# Patient Record
Sex: MALE | Race: WHITE | NOT HISPANIC OR LATINO | URBAN - METROPOLITAN AREA
[De-identification: names, ages, dates, MRNs, and addresses within clinical notes are randomized per-mention and may not be internally consistent; named-entity substitution may affect disease eponyms.]

---

## 2017-01-16 ENCOUNTER — INPATIENT (INPATIENT)
Facility: HOSPITAL | Age: 52
LOS: 0 days | Discharge: HOME | End: 2017-01-17
Attending: INTERNAL MEDICINE

## 2017-03-26 ENCOUNTER — EMERGENCY (EMERGENCY)
Facility: HOSPITAL | Age: 52
LOS: 0 days | Discharge: HOME | End: 2017-03-26
Admitting: INTERNAL MEDICINE

## 2017-06-27 DIAGNOSIS — H93.13 TINNITUS, BILATERAL: ICD-10-CM

## 2017-06-27 DIAGNOSIS — Z79.02 LONG TERM (CURRENT) USE OF ANTITHROMBOTICS/ANTIPLATELETS: ICD-10-CM

## 2017-06-27 DIAGNOSIS — Z79.899 OTHER LONG TERM (CURRENT) DRUG THERAPY: ICD-10-CM

## 2017-06-27 DIAGNOSIS — I10 ESSENTIAL (PRIMARY) HYPERTENSION: ICD-10-CM

## 2017-06-27 DIAGNOSIS — R20.2 PARESTHESIA OF SKIN: ICD-10-CM

## 2017-06-27 DIAGNOSIS — Z87.891 PERSONAL HISTORY OF NICOTINE DEPENDENCE: ICD-10-CM

## 2017-06-27 DIAGNOSIS — R07.89 OTHER CHEST PAIN: ICD-10-CM

## 2017-06-27 DIAGNOSIS — E78.00 PURE HYPERCHOLESTEROLEMIA, UNSPECIFIED: ICD-10-CM

## 2017-07-18 DIAGNOSIS — R07.9 CHEST PAIN, UNSPECIFIED: ICD-10-CM

## 2017-07-18 DIAGNOSIS — Z72.0 TOBACCO USE: ICD-10-CM

## 2017-07-18 DIAGNOSIS — I25.10 ATHEROSCLEROTIC HEART DISEASE OF NATIVE CORONARY ARTERY WITHOUT ANGINA PECTORIS: ICD-10-CM

## 2017-07-18 DIAGNOSIS — Z82.49 FAMILY HISTORY OF ISCHEMIC HEART DISEASE AND OTHER DISEASES OF THE CIRCULATORY SYSTEM: ICD-10-CM

## 2017-07-18 DIAGNOSIS — R91.1 SOLITARY PULMONARY NODULE: ICD-10-CM

## 2017-07-18 DIAGNOSIS — E78.5 HYPERLIPIDEMIA, UNSPECIFIED: ICD-10-CM

## 2017-07-18 DIAGNOSIS — M54.9 DORSALGIA, UNSPECIFIED: ICD-10-CM

## 2017-07-18 DIAGNOSIS — G89.29 OTHER CHRONIC PAIN: ICD-10-CM

## 2018-01-26 ENCOUNTER — OUTPATIENT (OUTPATIENT)
Dept: OUTPATIENT SERVICES | Facility: HOSPITAL | Age: 53
LOS: 1 days | Discharge: HOME | End: 2018-01-26

## 2018-01-26 DIAGNOSIS — R05 COUGH: ICD-10-CM

## 2018-02-04 DIAGNOSIS — I25.10 ATHEROSCLEROTIC HEART DISEASE OF NATIVE CORONARY ARTERY WITHOUT ANGINA PECTORIS: ICD-10-CM

## 2018-02-04 DIAGNOSIS — R07.9 CHEST PAIN, UNSPECIFIED: ICD-10-CM

## 2022-03-28 ENCOUNTER — OUTPATIENT (OUTPATIENT)
Dept: OUTPATIENT SERVICES | Facility: HOSPITAL | Age: 57
LOS: 1 days | Discharge: HOME | End: 2022-03-28
Payer: COMMERCIAL

## 2022-03-28 DIAGNOSIS — R53.83 OTHER FATIGUE: ICD-10-CM

## 2022-03-28 PROCEDURE — 75574 CT ANGIO HRT W/3D IMAGE: CPT | Mod: 26

## 2022-03-30 ENCOUNTER — OUTPATIENT (OUTPATIENT)
Dept: OUTPATIENT SERVICES | Facility: HOSPITAL | Age: 57
LOS: 1 days | Discharge: HOME | End: 2022-03-30
Payer: COMMERCIAL

## 2022-03-30 PROCEDURE — 0504T: CPT

## 2022-03-31 DIAGNOSIS — R53.83 OTHER FATIGUE: ICD-10-CM

## 2022-04-11 ENCOUNTER — OUTPATIENT (OUTPATIENT)
Dept: OUTPATIENT SERVICES | Facility: HOSPITAL | Age: 57
LOS: 1 days | Discharge: HOME | End: 2022-04-11

## 2022-04-11 VITALS
RESPIRATION RATE: 16 BRPM | HEART RATE: 68 BPM | OXYGEN SATURATION: 100 % | DIASTOLIC BLOOD PRESSURE: 87 MMHG | TEMPERATURE: 97 F | SYSTOLIC BLOOD PRESSURE: 138 MMHG

## 2022-04-11 DIAGNOSIS — Z98.890 OTHER SPECIFIED POSTPROCEDURAL STATES: Chronic | ICD-10-CM

## 2022-04-11 LAB
ANION GAP SERPL CALC-SCNC: 13 MMOL/L — SIGNIFICANT CHANGE UP (ref 7–14)
BUN SERPL-MCNC: 13 MG/DL — SIGNIFICANT CHANGE UP (ref 10–20)
CALCIUM SERPL-MCNC: 10 MG/DL — SIGNIFICANT CHANGE UP (ref 8.5–10.1)
CHLORIDE SERPL-SCNC: 103 MMOL/L — SIGNIFICANT CHANGE UP (ref 98–110)
CO2 SERPL-SCNC: 26 MMOL/L — SIGNIFICANT CHANGE UP (ref 17–32)
CREAT SERPL-MCNC: 0.8 MG/DL — SIGNIFICANT CHANGE UP (ref 0.7–1.5)
EGFR: 104 ML/MIN/1.73M2 — SIGNIFICANT CHANGE UP
GLUCOSE SERPL-MCNC: 91 MG/DL — SIGNIFICANT CHANGE UP (ref 70–99)
HCT VFR BLD CALC: 41.7 % — LOW (ref 42–52)
HCT VFR BLD CALC: 45.9 % — SIGNIFICANT CHANGE UP (ref 42–52)
HGB BLD-MCNC: 14.7 G/DL — SIGNIFICANT CHANGE UP (ref 14–18)
HGB BLD-MCNC: 15.5 G/DL — SIGNIFICANT CHANGE UP (ref 14–18)
MCHC RBC-ENTMCNC: 29.9 PG — SIGNIFICANT CHANGE UP (ref 27–31)
MCHC RBC-ENTMCNC: 30.2 PG — SIGNIFICANT CHANGE UP (ref 27–31)
MCHC RBC-ENTMCNC: 33.8 G/DL — SIGNIFICANT CHANGE UP (ref 32–37)
MCHC RBC-ENTMCNC: 35.3 G/DL — SIGNIFICANT CHANGE UP (ref 32–37)
MCV RBC AUTO: 85.8 FL — SIGNIFICANT CHANGE UP (ref 80–94)
MCV RBC AUTO: 88.4 FL — SIGNIFICANT CHANGE UP (ref 80–94)
NRBC # BLD: 0 /100 WBCS — SIGNIFICANT CHANGE UP (ref 0–0)
NRBC # BLD: 0 /100 WBCS — SIGNIFICANT CHANGE UP (ref 0–0)
PLATELET # BLD AUTO: 273 K/UL — SIGNIFICANT CHANGE UP (ref 130–400)
PLATELET # BLD AUTO: 317 K/UL — SIGNIFICANT CHANGE UP (ref 130–400)
POTASSIUM SERPL-MCNC: 5.4 MMOL/L — HIGH (ref 3.5–5)
POTASSIUM SERPL-SCNC: 5.4 MMOL/L — HIGH (ref 3.5–5)
RBC # BLD: 4.86 M/UL — SIGNIFICANT CHANGE UP (ref 4.7–6.1)
RBC # BLD: 5.19 M/UL — SIGNIFICANT CHANGE UP (ref 4.7–6.1)
RBC # FLD: 13.5 % — SIGNIFICANT CHANGE UP (ref 11.5–14.5)
RBC # FLD: 13.8 % — SIGNIFICANT CHANGE UP (ref 11.5–14.5)
SODIUM SERPL-SCNC: 142 MMOL/L — SIGNIFICANT CHANGE UP (ref 135–146)
WBC # BLD: 11.41 K/UL — HIGH (ref 4.8–10.8)
WBC # BLD: 12.79 K/UL — HIGH (ref 4.8–10.8)
WBC # FLD AUTO: 11.41 K/UL — HIGH (ref 4.8–10.8)
WBC # FLD AUTO: 12.79 K/UL — HIGH (ref 4.8–10.8)

## 2022-04-11 RX ORDER — ATORVASTATIN CALCIUM 80 MG/1
1 TABLET, FILM COATED ORAL
Qty: 30 | Refills: 1
Start: 2022-04-11 | End: 2022-06-09

## 2022-04-11 RX ORDER — VALSARTAN 80 MG/1
1 TABLET ORAL
Qty: 0 | Refills: 0 | DISCHARGE

## 2022-04-11 RX ORDER — PANTOPRAZOLE SODIUM 20 MG/1
1 TABLET, DELAYED RELEASE ORAL
Qty: 0 | Refills: 0 | DISCHARGE

## 2022-04-11 RX ORDER — CLOPIDOGREL BISULFATE 75 MG/1
1 TABLET, FILM COATED ORAL
Qty: 30 | Refills: 2
Start: 2022-04-11 | End: 2022-07-09

## 2022-04-11 NOTE — ASU PATIENT PROFILE, ADULT - NSICDXPASTMEDICALHX_GEN_ALL_CORE_FT
PAST MEDICAL HISTORY:  GERD (gastroesophageal reflux disease)     Mild CAD     Mild HTN     Smoker

## 2022-04-11 NOTE — H&P CARDIOLOGY - ATTENDING COMMENTS
Known patient to me from the office, smoker with CAD, HTN and high Lipid reluctant to take statins, presented with SOB-ARAUJO and chest discomfort, underwent CCTA and Calcium score resulted >800 Agatston Ca score and LCx and RCA stenosis confirmed by CT-FFR, scheduled for the cardiac cath, still reluctant to take statins.

## 2022-04-11 NOTE — CHART NOTE - NSCHARTNOTEFT_GEN_A_CORE
PRE-OP DIAGNOSIS:    stable angina, abnormal CCTA / CT FFR 3/30/2022    PROCEDURE:     [x] Coronary Angiogram     [x] LHC     [] LVG     [] RHC     [x] Intervention (see below)         PHYSICIAN:  Dr. Mcguire, Dr. Paul    ASSISTANT:  Dr. Maria       PROCEDURE DESCRIPTION:     Consent:      [x] Patient     [] Family Member     []  Used        Anesthesia:     [] General     [x] Sedation     [x] Local        Access & Closure:     [x] 6 Fr right Radial Artery (TR band)    [] Fr Femoral Artery     [] Fr Femoral Vein     [] Fr Brachial Vein       IV Contrast: 140 mL        Intervention:   - successful balloon angioplasty and INDIO x 1 implantation in LPL artery    Implants:   - 3.25 x 18 mm Xience INDIO x 1 in LPL     FINDINGS:     Coronary Dominance: left dominant      LM: normal    LAD: mild disease in prox and mid LAD, myocardial bridging in mid LAD, 80% stenosis of distal LAD    LCX: large dominant vessel, mild disease in prox LCX with 30% stenosis  OM1: mild disease  OM2: minor luminal irregularities  LPL: 90% stenosis, s/p PCI  LDPA: minor luminal irregularities    RCA: small non-dominant vessel with severe diffuse atherosclerosis       LVEDP: 5 mmHg     EF: 60%        ESTIMATED BLOOD LOSS: < 10 mL        CONDITION:     [x] Good     [] Fair     [] Critical        SPECIMEN REMOVED: N/A       POST-OP DIAGNOSIS:      [] Normal Coronary Angiogram     [] Mild Coronary Artery Disease (< 50% stenosis)     [x] 3 Vessel Coronary Artery Disease (severe disease in non-dominant RCA, LPL, and distal LAD), SYNTAX Score I of 8       PLAN OF CARE:     [x] D/C Home Today     [x] Medications:   - Start Plavix 75mg po q24, and lipitor 20mg po qhs  - continue with ASA 81mg, and valsartan  - no beta-blocker currently due to bradycardia    [x] IV Fluids:  NS@150cc/hr x 6 hours PRE-OP DIAGNOSIS:    stable angina, abnormal CCTA / CT FFR 3/30/2022    PROCEDURE:     [x] Coronary Angiogram     [x] LHC     [] LVG     [] RHC     [x] Intervention (see below)         PHYSICIAN:  Dr. Mcguire, Dr. Paul    ASSISTANT:  Dr. Maria       PROCEDURE DESCRIPTION:     Consent:      [x] Patient     [] Family Member     []  Used        Anesthesia:     [] General     [x] Sedation     [x] Local        Access & Closure:     [x] 6 Fr right Radial Artery (TR band)    [] Fr Femoral Artery     [] Fr Femoral Vein     [] Fr Brachial Vein       IV Contrast: 140 mL        Intervention:   - successful balloon angioplasty and INDIO x 1 implantation in LPL artery    Implants:   - 3.25 x 18 mm Xience INDIO x 1 in LPL     FINDINGS:     Coronary Dominance: left dominant      LM: normal    LAD: mild disease in prox and mid LAD, myocardial bridging in mid LAD, 80% stenosis of distal LAD    LCX: large dominant vessel, mild disease in prox LCX with 30% stenosis  OM1: mild disease  OM2: minor luminal irregularities  Left AV groove Artery: 90% stenosis, s/p PCI  LDPA: minor luminal irregularities    RCA: small non-dominant vessel with severe diffuse atherosclerosis       LVEDP: 5 mmHg     EF: 60%        ESTIMATED BLOOD LOSS: < 10 mL        CONDITION:     [x] Good     [] Fair     [] Critical        SPECIMEN REMOVED: N/A       POST-OP DIAGNOSIS:      [] Normal Coronary Angiogram     [] Mild Coronary Artery Disease (< 50% stenosis)     [x] 3 Vessel Coronary Artery Disease (severe disease in non-dominant RCA, L AV groove A, and distal LAD), SYNTAX Score I of 8       PLAN OF CARE:     [x] D/C Home Today     [x] Medications:   - Start Plavix 75mg po q24, and lipitor 20mg po qhs  - continue with ASA 81mg, and valsartan  - no beta-blocker currently due to bradycardia    [x] IV Fluids:  NS@150cc/hr x 6 hours

## 2022-04-11 NOTE — ASU PATIENT PROFILE, ADULT - FALL HARM RISK - UNIVERSAL INTERVENTIONS
Bed in lowest position, wheels locked, appropriate side rails in place/Call bell, personal items and telephone in reach/Instruct patient to call for assistance before getting out of bed or chair/Non-slip footwear when patient is out of bed/Nitro to call system/Physically safe environment - no spills, clutter or unnecessary equipment/Purposeful Proactive Rounding/Room/bathroom lighting operational, light cord in reach

## 2022-04-11 NOTE — H&P CARDIOLOGY - HISTORY OF PRESENT ILLNESS
Patient is a 56y Male who presents to the cardiology department for LHC.       Pre cath note:    indication:  [ ] STEMI                [ ] NSTEMI                 [ ] Acute coronary syndrome                     [ ]Unstable Angina   [ ] high risk  [ ] intermediate risk  [ ] low risk                     [x ] Stable Angina     non-invasive testing:                          Date:      3/28/22               result: [ ] high risk  [x ] intermediate risk  [ ] low risk    Anti- Anginal medications:                    [ ] not used                       [ x] used                   [ ] not used but strong indication not to use    Ejection Fraction                   [ ] <29            [ ] 30-39%   [ ] 40-49%     [ ]>50%    CHF                   [ ] active (within last 14 days on meds   [ ] Chronic (on meds but no exacerbation)    COPD                   [ ] mild (on chronic bronchodilators)  [ ] moderate (on chronic steroid therapy)      [ ] severe (indication for home O2 or PACO2 >50)    Other risk factors:                       [ ] Previous MI                     [ ] CVA/ stroke                    [ ] carotid stent/ CEA                    [ ] PVD/PAD- (arterial aneurysm, non-palpable pulses, tortuous vessel with inability to insert catheter, infra-renal dissection, renal or subclavian artery stenosis)                    [ ] diabetic                    [ ] previous CABG                    [ ] Renal Failure     NS @ 300 CC/HR X 1 HOUR PRIOR TO CATH   Adjusted Bleeding Score: 0.9%    SUBJ:  55 y/o male presents for LHC.  Pt c/o SOB, ARAUJO and chest pressure.  Pt had OP CCTA 3/28/22  Calcium score 805  Multifocal areas of dense calcified plaque limiting intraluminal evaluation  mixed plaque within the mid left circumflex and M1 branch contributing to apparent moderate stenosis.         Past Medical History:    CAD  HTN      Past Surgical History:  Spinal surgery  Knee surgery  right wrist surgery      REVIEW OF SYSTEMS:  CONSTITUTIONAL: No fever, weight loss, or fatigue  CARDIOLOGY: Patient denies chest pain, shortness of breath or syncopal episodes.   RESPIRATORY: denies shortness of breath, wheezing.   NEUROLOGICAL: NO weakness, no focal deficits to report.  GI: no BRBPR, no N,V,diarrhea.     PHYSICAL EXAM:  · CONSTITUTIONAL:	Well-developed, well nourished     · RESPIRATORY:   airway patent; breath sounds equal; good air movement; respirations non-labored; clear to auscultation bilaterally; no chest wall tenderness; no intercostal retractions; no rales,rhonchi or wheeze  · CARDIOVASCULAR	regular rate and rhythm  no rub  no murmur    · EXTREMITIES: No cyanosis, clubbing or edema  · VASCULAR: 	Equal and normal pulses (carotid, femoral, dorsalis pedis)  	  Cong Test ABNORMAL

## 2022-04-15 DIAGNOSIS — F17.210 NICOTINE DEPENDENCE, CIGARETTES, UNCOMPLICATED: ICD-10-CM

## 2022-04-15 DIAGNOSIS — E78.5 HYPERLIPIDEMIA, UNSPECIFIED: ICD-10-CM

## 2022-04-15 DIAGNOSIS — I20.8 OTHER FORMS OF ANGINA PECTORIS: ICD-10-CM

## 2022-04-15 DIAGNOSIS — I10 ESSENTIAL (PRIMARY) HYPERTENSION: ICD-10-CM

## 2022-04-15 DIAGNOSIS — I25.10 ATHEROSCLEROTIC HEART DISEASE OF NATIVE CORONARY ARTERY WITHOUT ANGINA PECTORIS: ICD-10-CM

## 2022-04-28 NOTE — PROGRESS NOTE ADULT - SUBJECTIVE AND OBJECTIVE BOX
Cardiology Follow up    [facility] PENA   56y Male  PAST MEDICAL & SURGICAL HISTORY:  Mild HTN    Mild CAD    GERD (gastroesophageal reflux disease)    Smoker    S/P wrist surgery         HPI:  Patient is a 56y Male who presents to the cardiology department for LHC.       SUBJ:  57 y/o male presents for LHC.  Pt c/o SOB, ARAUJO and chest pressure.  Pt had OP CCTA 3/28/22  Calcium score 805  Multifocal areas of dense calcified plaque limiting intraluminal evaluation  mixed plaque within the mid left circumflex and M1 branch contributing to apparent moderate stenosis.       Allergies    No Known Allergies    Intolerances      Patient seen and examined at bedside. No acute events   Patient without complaints. Pt ambulated without issues/symptoms  Denies CP, SOB, palpitations, or dizziness  No events on telemetry     Vital Signs Last 24 Hrs    HR: 59  BP: 122/89  RR: 16   SpO2: 100%           REVIEW OF SYSTEMS:          All negative except as mentioned in HPI    PHYSICAL EXAM:           CONSTITUTIONAL: Well-developed; well-nourished; in no acute distress  	SKIN: warm, dry  	HEAD: Normocephalic; atraumatic  	EYES: PERRL.  	ENT: No nasal discharge, airway clear, mucous membranes moist  	NECK: Supple; non tender.  	CARD: +S1, +S2, no murmurs, gallops, or rubs. Regular rate and rhythm    	RESP: No wheezes, rales or rhonchi. CTA B/L  	ABD: soft ntnd, + BS x 4 quadrants  	EXT: moves all extremities,  no clubbing, cyanosis or edema  	NEURO: Alert and oriented x3, no focal deficits          PSYCH: Cooperative, appropriate          VASCULAR:  + Rad / + PTs / +  DPs          EXTREMITY:              	   Right Radial: Dressing D/C/I, TR band in place,  access site soft, no hematoma, no pain, + pulses, no sign of infection, no numbness            ECG:                                                                                                                 EKG  PENDING 1400  CBC  pending 1400     LABS:                        15.5   12.79 )-----------( 317      ( 11 Apr 2022 07:08 )             45.9     04-11    142  |  103  |  13  ----------------------------<  91  5.4<H>   |  26  |  0.8    Ca    10.0      11 Apr 2022 07:08              A/P:  I discussed the case with Cardiologist Dr. Mcguire and recommend the following:    S/P PCI:      Intervention:   - successful balloon angioplasty and INDIO x 1 implantation in LPL artery    Implants:   - 3.25 x 18 mm Xience INDIO x 1 in LPL     FINDINGS:     Coronary Dominance: left dominant    LM: normal    LAD: mild disease in prox and mid LAD, myocardial bridging in mid LAD, 80% stenosis of distal LAD    LCX: large dominant vessel, mild disease in prox LCX with 30% stenosis  OM1: mild disease  OM2: minor luminal irregularities  Left AV groove Artery: 90% stenosis, s/p PCI  LDPA: minor luminal irregularities    RCA: small non-dominant vessel with severe diffuse atherosclerosis       LVEDP: 5 mmHg     EF: 60%     POST-OP DIAGNOSIS:      x] 3 Vessel Coronary Artery Disease (severe disease in non-dominant RCA, L AV groove A, and distal LAD), SYNTAX Score I of 8                           CBC/ECG at 1400                    NS  150 cc/hr x _hr  	         Continue DAPT ( Aspirin 81 mg PO Daily and  Plavix 75 mg po daily), ARB , Statin Therapy                   No BB at this time intermittent sol cardia                    Patient given 30 day supply of ( Aspirin 81 mg daily and Plavix 75 mg daily ) to take at home                   Patient agreeing to take DAPT for at least one year or as directed by cardiologist                    Pt given instructions on importance of taking antiplatelet medication or risk acute stent thrombosis/death                   Post cath instructions, access site care and activity restrictions reviewed with patient                     Discussed with patient to return to hospital if experience chest pain, shortness breath, dizziness and site bleeding                   Aggressive risk factor modification, diet counseling, smoking cessation discussed with patient                       Can discharge patient from cardiac standpoint at 1600 after ambulating without symptoms and access site wnl, ECG and blood work reviewed                    Benefits of Cardiac Rehab discussed with patient, All documents sent to [facility]. Patient instructed to call and make first                               appointment after first f/u visit with Cardiologist                    Follow up with Cardiology Dr. Mcguire in  two weeks.  Instructed to call and make an appointment                      Discharge instructions as follows, when ready to d/c:                   - Continue medical regimen as prescribed to prevent chest pain                   - Continue dual anti-platelet therapy, beta blocker, statin                   - If you are diabetic and taking medication containing Metformin, do not take them for 48 hours after the procedure                   - Instructed to call 911 if chest pain, shortness of breath or bleeding from access site                   - No heavy lifting >10lbs x 1 week                   - No driving x 24 hours                   - No baths, swimming pools x 1 week, may shower                   - Low sodium low fat low cholesterol diet                   - Follow-up with Cardiologist in 1-2 weeks after discharge                                        28-Apr-2022 04:44

## 2022-06-28 PROBLEM — K21.9 GASTRO-ESOPHAGEAL REFLUX DISEASE WITHOUT ESOPHAGITIS: Chronic | Status: ACTIVE | Noted: 2022-04-11

## 2022-06-28 PROBLEM — I10 ESSENTIAL (PRIMARY) HYPERTENSION: Chronic | Status: ACTIVE | Noted: 2022-04-11

## 2022-06-28 PROBLEM — F17.200 NICOTINE DEPENDENCE, UNSPECIFIED, UNCOMPLICATED: Chronic | Status: ACTIVE | Noted: 2022-04-11

## 2022-07-11 ENCOUNTER — OUTPATIENT (OUTPATIENT)
Dept: OUTPATIENT SERVICES | Facility: HOSPITAL | Age: 57
LOS: 1 days | Discharge: HOME | End: 2022-07-11

## 2022-07-11 DIAGNOSIS — Z98.890 OTHER SPECIFIED POSTPROCEDURAL STATES: Chronic | ICD-10-CM

## 2022-07-11 LAB
ANION GAP SERPL CALC-SCNC: 11 MMOL/L — SIGNIFICANT CHANGE UP (ref 7–14)
BUN SERPL-MCNC: 11 MG/DL — SIGNIFICANT CHANGE UP (ref 10–20)
CALCIUM SERPL-MCNC: 9.7 MG/DL — SIGNIFICANT CHANGE UP (ref 8.5–10.1)
CHLORIDE SERPL-SCNC: 108 MMOL/L — SIGNIFICANT CHANGE UP (ref 98–110)
CO2 SERPL-SCNC: 26 MMOL/L — SIGNIFICANT CHANGE UP (ref 17–32)
CREAT SERPL-MCNC: 0.7 MG/DL — SIGNIFICANT CHANGE UP (ref 0.7–1.5)
EGFR: 107 ML/MIN/1.73M2 — SIGNIFICANT CHANGE UP
GLUCOSE SERPL-MCNC: 102 MG/DL — HIGH (ref 70–99)
HCT VFR BLD CALC: 37.6 % — LOW (ref 42–52)
HCT VFR BLD CALC: 41.3 % — LOW (ref 42–52)
HGB BLD-MCNC: 13 G/DL — LOW (ref 14–18)
HGB BLD-MCNC: 14.3 G/DL — SIGNIFICANT CHANGE UP (ref 14–18)
MCHC RBC-ENTMCNC: 29.9 PG — SIGNIFICANT CHANGE UP (ref 27–31)
MCHC RBC-ENTMCNC: 30 PG — SIGNIFICANT CHANGE UP (ref 27–31)
MCHC RBC-ENTMCNC: 34.6 G/DL — SIGNIFICANT CHANGE UP (ref 32–37)
MCHC RBC-ENTMCNC: 34.6 G/DL — SIGNIFICANT CHANGE UP (ref 32–37)
MCV RBC AUTO: 86.4 FL — SIGNIFICANT CHANGE UP (ref 80–94)
MCV RBC AUTO: 86.6 FL — SIGNIFICANT CHANGE UP (ref 80–94)
NRBC # BLD: 0 /100 WBCS — SIGNIFICANT CHANGE UP (ref 0–0)
NRBC # BLD: 0 /100 WBCS — SIGNIFICANT CHANGE UP (ref 0–0)
PLATELET # BLD AUTO: 249 K/UL — SIGNIFICANT CHANGE UP (ref 130–400)
PLATELET # BLD AUTO: 260 K/UL — SIGNIFICANT CHANGE UP (ref 130–400)
POTASSIUM SERPL-MCNC: 5.2 MMOL/L — HIGH (ref 3.5–5)
POTASSIUM SERPL-SCNC: 5.2 MMOL/L — HIGH (ref 3.5–5)
RBC # BLD: 4.35 M/UL — LOW (ref 4.7–6.1)
RBC # BLD: 4.77 M/UL — SIGNIFICANT CHANGE UP (ref 4.7–6.1)
RBC # FLD: 14.6 % — HIGH (ref 11.5–14.5)
RBC # FLD: 14.6 % — HIGH (ref 11.5–14.5)
SODIUM SERPL-SCNC: 145 MMOL/L — SIGNIFICANT CHANGE UP (ref 135–146)
WBC # BLD: 10.56 K/UL — SIGNIFICANT CHANGE UP (ref 4.8–10.8)
WBC # BLD: 9.68 K/UL — SIGNIFICANT CHANGE UP (ref 4.8–10.8)
WBC # FLD AUTO: 10.56 K/UL — SIGNIFICANT CHANGE UP (ref 4.8–10.8)
WBC # FLD AUTO: 9.68 K/UL — SIGNIFICANT CHANGE UP (ref 4.8–10.8)

## 2022-07-11 PROCEDURE — 93010 ELECTROCARDIOGRAM REPORT: CPT

## 2022-07-11 RX ORDER — ASPIRIN/CALCIUM CARB/MAGNESIUM 324 MG
1 TABLET ORAL
Qty: 0 | Refills: 0 | DISCHARGE

## 2022-07-11 RX ORDER — ASPIRIN/CALCIUM CARB/MAGNESIUM 324 MG
4 TABLET ORAL
Qty: 0 | Refills: 0 | DISCHARGE

## 2022-07-11 RX ORDER — AMLODIPINE BESYLATE 2.5 MG/1
1 TABLET ORAL
Qty: 0 | Refills: 0 | DISCHARGE

## 2022-07-11 NOTE — H&P CARDIOLOGY - NSICDXFAMILYHX_GEN_ALL_CORE_FT
FAMILY HISTORY:  Father  Still living? Unknown  FH: CAD (coronary artery disease), Age at diagnosis: Age Unknown    Sibling  Still living? Unknown  FH: MI (myocardial infarction), Age at diagnosis: Age Unknown

## 2022-07-11 NOTE — H&P CARDIOLOGY - NSICDXPASTMEDICALHX_GEN_ALL_CORE_FT
PAST MEDICAL HISTORY:  GERD (gastroesophageal reflux disease)     HLD (hyperlipidemia)     Mild CAD PCI LPL 4/11/22    Mild HTN     Smoker

## 2022-07-11 NOTE — H&P CARDIOLOGY - ATTENDING COMMENTS
known patient to me here for the stage PCI-stent in the LAD.  CAD, HTN, high lipid, recent stent in distal LCx -AV groove  now is getting stent in mid distal LAD

## 2022-07-11 NOTE — H&P CARDIOLOGY - NSICDXPASTSURGICALHX_GEN_ALL_CORE_FT
PAST SURGICAL HISTORY:  H/O right knee surgery     H/O right wrist surgery     S/P wrist surgery

## 2022-07-11 NOTE — H&P CARDIOLOGY - HISTORY OF PRESENT ILLNESS
Patient is a 57y Male who presents to the cardiology department for [facility].            PMH: CAD s/p PCI L AV groove artery, HTN, HLD, GERD           PSH:  spinal sx, R knee sx, R wrist sx    Pt had OP CCTA 3/28/22, Ca score 805, s/p PCI L AV groove artery, here for staged PCI LAD, pt still reports chronic fatigue , [facility] recommended  Pt had OP CCTA 3/28/22  Calcium score 805  Multifocal areas of dense calcified plaque limiting intraluminal evaluation  mixed plaque within the mid left circumflex and M1 branch contributing to apparent moderate stenosis.     Pre cath note:    indication:  [ ] STEMI                [ ] NSTEMI                 [ ] Acute coronary syndrome                     [ ]Unstable Angina   [ ] high risk  [ ] intermediate risk  [ ] low risk                     [ ] Stable Angina     non-invasive testing:        CCTA                  Date:      3/28/22               result: [ ] high risk  [x ] intermediate risk  [ ] low risk                   (x) Staged PCI  Anti- Anginal medications:                    [ ] not used                       [ x] used                   [ ] not used but strong indication not to use    Ejection Fraction                   [ ] <29            [ ] 30-39%   [ ] 40-49%     [x ]>50%    CHF                   [ ] active (within last 14 days on meds   [ ] Chronic (on meds but no exacerbation)    COPD                   [ ] mild (on chronic bronchodilators)  [ ] moderate (on chronic steroid therapy)      [ ] severe (indication for home O2 or PACO2 >50)    Other risk factors:                       [ ] Previous MI                     [ ] CVA/ stroke                    [ ] carotid stent/ CEA                    [ ] PVD/PAD- (arterial aneurysm, non-palpable pulses, tortuous vessel with inability to insert catheter, infra-renal dissection, renal or subclavian artery stenosis)                    [ ] diabetic                    [ ] previous CABG                    [ ] Renal Failure     NS @ 250CC/HR X 1 HOUR PRIOR TO CATH   Adjusted Bleeding Score: 0.9%      Past Medical History:    CAD, s/p PCI LPL on 4/11/22  HTN  HLD  GERD      Past Surgical History:  Spinal surgery  Knee surgery  right wrist surgery      REVIEW OF SYSTEMS:  CONSTITUTIONAL: No fever, weight loss, or fatigue  CARDIOLOGY: Patient denies chest pain, shortness of breath or syncopal episodes.   RESPIRATORY: denies shortness of breath, wheezing.   NEUROLOGICAL: NO weakness, no focal deficits to report.  GI: no BRBPR, no N,V,diarrhea.     PHYSICAL EXAM:  · CONSTITUTIONAL:	Well-developed, well nourished     · RESPIRATORY:   airway patent; breath sounds equal; good air movement; respirations non-labored; clear to auscultation bilaterally; no chest wall tenderness; no intercostal retractions; no rales,rhonchi or wheeze  · CARDIOVASCULAR	regular rate and rhythm  no rub  no murmur    · EXTREMITIES: No cyanosis, clubbing or edema  · VASCULAR: 	Equal and normal pulses (carotid, femoral, dorsalis pedis)  	  Cong Test ABNORMAL      EKG; SR

## 2022-07-11 NOTE — PROGRESS NOTE ADULT - SUBJECTIVE AND OBJECTIVE BOX
Cardiology Follow up s/p staged PCI dLDEIRDRE    ION PENA   57y Male  PAST MEDICAL & SURGICAL HISTORY:  Mild HTN      Mild CAD  PCI LPL 4/11/22      GERD (gastroesophageal reflux disease)      Smoker      HLD (hyperlipidemia)      S/P wrist surgery      H/O right knee surgery      H/O right wrist surgery           HPI:  Patient is a 57y Male who presents to the cardiology department for LHC.            PMH: CAD s/p PCI L AV groove artery, HTN, HLD, GERD           PSH:  spinal sx, R knee sx, R wrist sx    Pt had OP CCTA 3/28/22, Ca score 805, s/p PCI L AV groove artery, here for staged PCI LAD, pt still reports chronic fatigue , [facility] recommended  Pt had OP CCTA 3/28/22  Calcium score 805  Multifocal areas of dense calcified plaque limiting intraluminal evaluation  mixed plaque within the mid left circumflex and M1 branch contributing to apparent moderate stenosis.     Pre cath note:    indication:  [ ] STEMI                [ ] NSTEMI                 [ ] Acute coronary syndrome                     [ ]Unstable Angina   [ ] high risk  [ ] intermediate risk  [ ] low risk                     [ ] Stable Angina     non-invasive testing:        CCTA                  Date:      3/28/22               result: [ ] high risk  [x ] intermediate risk  [ ] low risk                   (x) Staged PCI  Anti- Anginal medications:                    [ ] not used                       [ x] used                   [ ] not used but strong indication not to use    Ejection Fraction                   [ ] <29            [ ] 30-39%   [ ] 40-49%     [x ]>50%    CHF                   [ ] active (within last 14 days on meds   [ ] Chronic (on meds but no exacerbation)    COPD                   [ ] mild (on chronic bronchodilators)  [ ] moderate (on chronic steroid therapy)      [ ] severe (indication for home O2 or PACO2 >50)    Other risk factors:                       [ ] Previous MI                     [ ] CVA/ stroke                    [ ] carotid stent/ CEA                    [ ] PVD/PAD- (arterial aneurysm, non-palpable pulses, tortuous vessel with inability to insert catheter, infra-renal dissection, renal or subclavian artery stenosis)                    [ ] diabetic                    [ ] previous CABG                    [ ] Renal Failure     NS @ 250CC/HR X 1 HOUR PRIOR TO CATH   Adjusted Bleeding Score: 0.9%      Past Medical History:    CAD, s/p PCI LPL on 4/11/22  HTN  HLD  GERD      Past Surgical History:  Spinal surgery  Knee surgery  right wrist surgery      REVIEW OF SYSTEMS:  CONSTITUTIONAL: No fever, weight loss, or fatigue  CARDIOLOGY: Patient denies chest pain, shortness of breath or syncopal episodes.   RESPIRATORY: denies shortness of breath, wheezing.   NEUROLOGICAL: NO weakness, no focal deficits to report.  GI: no BRBPR, no N,V,diarrhea.     PHYSICAL EXAM:  · CONSTITUTIONAL:	Well-developed, well nourished     · RESPIRATORY:   airway patent; breath sounds equal; good air movement; respirations non-labored; clear to auscultation bilaterally; no chest wall tenderness; no intercostal retractions; no rales,rhonchi or wheeze  · CARDIOVASCULAR	regular rate and rhythm  no rub  no murmur    · EXTREMITIES: No cyanosis, clubbing or edema  · VASCULAR: 	Equal and normal pulses (carotid, femoral, dorsalis pedis)  	  Cong Test ABNORMAL      EKG; SR (11 Jul 2022 08:55)    Allergies    No Known Allergies    Intolerances      Patient without complaints. Pt ambulated without issues/symptoms  Denies CP, SOB, palpitations, or dizziness  No events on telemetry         REVIEW OF SYSTEMS:          CONSTITUTIONAL: No weakness, fevers or chills          EYES/ENT: No visual changes;  No vertigo or throat pain           NECK: No pain or stiffness          RESPIRATORY: No cough, wheezing, hemoptysis          CARDIOVASCULAR: no pain, no ARAUJO, no palpitations           GASTROINTESTINAL: No abdominal or epigastric pain. No nausea, vomiting, or hematemesis;           GENITOURINARY: No dysuria, frequency or hematuria          NEUROLOGICAL: No numbness or weakness          SKIN: No itching, rashes    PHYSICAL EXAM:           CONSTITUTIONAL: Well-developed; well-nourished; in no acute distress  	SKIN: warm, dry  	HEAD: Normocephalic; atraumatic  	EYES: PERRL.  	ENT: No nasal discharge, airway clear, mucous membranes moist  	NECK: Supple; non tender.  	CARD: +S1, +S2, no murmurs, gallops, or rubs. (Regular) rate and rhythm    	RESP: No wheezes, rales or rhonchi. CTA B/L  	ABD: soft ntnd, + BS x 4 quadrants  	EXT: moves all extremities,  no clubbing, cyanosis or edema  	NEURO: Alert and oriented x3, no focal deficits          PSYCH: Cooperative, appropriate          VASCULAR:  + Rad / + PTs / + DPs          EXTREMITY:  	   Right Radial: + TR band in place, access site soft, + pulses, no hematoma, no pain, no numbness, no signs and symptoms of infection             ECG/CBC P @ 1415                                                                                                                 LABS:                        14.3   10.56 )-----------( 260      ( 11 Jul 2022 08:43 )             41.3     07-11    145  |  108  |  11  ----------------------------<  102<H>  5.2<H>   |  26  |  0.7    Ca    9.7      11 Jul 2022 08:43      A/P:  I discussed the case with Cardiologist Dr. Mcguire and recommend the following:    S/P PCI:   Intervention:    - Successful balloon angioplasty and INDIO x1 distal LAD      Implants:    - 2.5 X 15 Xience Skypoint drug-eluting stent     FINDINGS:     Coronary Dominance: Left-dominant      LM: Normal    LAD: Mild disease in prox and mid LAD, 80% stenosis of distal LAD s/p PCI    CX: Large dominant vessel, mild disease in prox LCX with 30% stenosis  OM1: Mild disease  Left AV groove artery: patent stent    RCA: small non-dominant vessel with severe diffuse atherosclerosis         LVEDP:12 mmHg     EF: 60 % on TTE       ESTIMATED BLOOD LOSS: < 10 mL        CONDITION:     [x] Good     [] Fair     [] Critical        SPECIMEN REMOVED: N/A       POST-OP DIAGNOSIS:      [] Normal Coronary Angiogram     [] Mild Coronary Artery Disease (< 50% stenosis)     [x] 3 Vessel Coronary Artery Disease (severe disease in non-dominant RCA, L AV groove A, and distal LAD)           PLAN OF CARE:     [x] D/C Home Today     [] Return to In-patient bed     [] Admit for observation     [] Return for Staged Procedure     [] CT Surgery Consult     [x] Medications: ASA, Plavix, Metoprolol, Valsartan, Amlodipine, atorvastatin    [x] IV Fluids: 100cc/hr x 4 hours       PLAN OF CARE:     [] D/C Home Today     [] Return to In-patient bed     [] Admit for observation     [] Return for Staged Procedure     [] CT Surgery Consult     [] Medications:     [] IV Fluids:      Electronic Signatures:  Gertrude Marvin)  (Signed 11-Jul-2022 10:51)  	Authored: Note Type, Note  Oliverio Paul)  (Signature Pending)  	Co-Signer: Note Type, Note      Last Updated: 11-Jul-2022 10:51 by Gertrude Marvin)  	                            Continue DAPT (vazalore 81 mg po daily, plavix 75mg po daily), CCB, Statin Therapy, ARB, PPI                   GI prophylaxis                   Patient given 30 day supply of (EC  Aspirin 81 mg daily and Plavix 75 mg daily ) to take at home                   CBC/EKG @ 1415                   NS  100 cc/hr x 6 hrs                    OOB-CH, ambulate with assistance                    monitor access site/pulses                   Patient agreeing to take DAPT for at least one year or as directed by cardiologist                    Pt given instructions on importance of taking antiplatelet medication or risk acute stent thrombosis/death                   Post cath instructions, access site care and activity restrictions reviewed with patient                      Benefits of Cardiac Rehab discussed with patient and all documents sent to Cardiac Rehab Center                   Patient instructed to call Cardiac Rehab and make first appointment after first f/u visit with Cardiologist                    Discussed with patient to return to hospital if experience chest pain, shortness breath, dizziness and site bleeding                   Aggressive risk factor modification, diet counseling, smoking cessation discussed with patient                       Can discharge patient from cardiac standpoint @ 1415 if labs/ekg/site WNL and ambulating without symptoms                    Follow up with Cardiology Dr. Mcguire in 1-2 weeks.  Patient instructed to call and make an appointment                      Discharge instructions as follows:                   - Continue medical regimen as prescribed to prevent chest pain                   - Continue dual anti-platelet therapy, ARB, statin, PPI                   - If you are diabetic and taking medication containing Metformin, do not take them for 48 hours after the procedure                   - Instructed to call 911 if chest pain, shortness of breath or bleeding from access site                   - No heavy lifting >10lbs x 1 week                   - No driving x 24 hours                   - No baths, swimming pools x 1 week, may shower                   - Low sodium low fat low cholesterol diet                   - Follow-up with Cardiologist in 1-2 weeks after discharge                                      Cardiology Follow up s/p staged PCI dLDEIRDRE    ION PENA   57y Male  PAST MEDICAL & SURGICAL HISTORY:  Mild HTN      Mild CAD  PCI LPL 4/11/22      GERD (gastroesophageal reflux disease)      Smoker      HLD (hyperlipidemia)      S/P wrist surgery      H/O right knee surgery      H/O right wrist surgery           HPI:  Patient is a 57y Male who presents to the cardiology department for LHC.            PMH: CAD s/p PCI L AV groove artery, HTN, HLD, GERD           PSH:  spinal sx, R knee sx, R wrist sx    Pt had OP CCTA 3/28/22, Ca score 805, s/p PCI L AV groove artery, here for staged PCI LAD, pt still reports chronic fatigue , [facility] recommended  Pt had OP CCTA 3/28/22  Calcium score 805  Multifocal areas of dense calcified plaque limiting intraluminal evaluation  mixed plaque within the mid left circumflex and M1 branch contributing to apparent moderate stenosis.     Pre cath note:    indication:  [ ] STEMI                [ ] NSTEMI                 [ ] Acute coronary syndrome                     [ ]Unstable Angina   [ ] high risk  [ ] intermediate risk  [ ] low risk                     [ ] Stable Angina     non-invasive testing:        CCTA                  Date:      3/28/22               result: [ ] high risk  [x ] intermediate risk  [ ] low risk                   (x) Staged PCI  Anti- Anginal medications:                    [ ] not used                       [ x] used                   [ ] not used but strong indication not to use    Ejection Fraction                   [ ] <29            [ ] 30-39%   [ ] 40-49%     [x ]>50%    CHF                   [ ] active (within last 14 days on meds   [ ] Chronic (on meds but no exacerbation)    COPD                   [ ] mild (on chronic bronchodilators)  [ ] moderate (on chronic steroid therapy)      [ ] severe (indication for home O2 or PACO2 >50)    Other risk factors:                       [ ] Previous MI                     [ ] CVA/ stroke                    [ ] carotid stent/ CEA                    [ ] PVD/PAD- (arterial aneurysm, non-palpable pulses, tortuous vessel with inability to insert catheter, infra-renal dissection, renal or subclavian artery stenosis)                    [ ] diabetic                    [ ] previous CABG                    [ ] Renal Failure     NS @ 250CC/HR X 1 HOUR PRIOR TO CATH   Adjusted Bleeding Score: 0.9%      Past Medical History:    CAD, s/p PCI LPL on 4/11/22  HTN  HLD  GERD      Past Surgical History:  Spinal surgery  Knee surgery  right wrist surgery      REVIEW OF SYSTEMS:  CONSTITUTIONAL: No fever, weight loss, or fatigue  CARDIOLOGY: Patient denies chest pain, shortness of breath or syncopal episodes.   RESPIRATORY: denies shortness of breath, wheezing.   NEUROLOGICAL: NO weakness, no focal deficits to report.  GI: no BRBPR, no N,V,diarrhea.     PHYSICAL EXAM:  · CONSTITUTIONAL:	Well-developed, well nourished     · RESPIRATORY:   airway patent; breath sounds equal; good air movement; respirations non-labored; clear to auscultation bilaterally; no chest wall tenderness; no intercostal retractions; no rales,rhonchi or wheeze  · CARDIOVASCULAR	regular rate and rhythm  no rub  no murmur    · EXTREMITIES: No cyanosis, clubbing or edema  · VASCULAR: 	Equal and normal pulses (carotid, femoral, dorsalis pedis)  	  Cong Test ABNORMAL      EKG; SR (11 Jul 2022 08:55)    Allergies    No Known Allergies    Intolerances      Patient without complaints. Pt ambulated without issues/symptoms  Denies CP, SOB, palpitations, or dizziness  S. French on telemetry         REVIEW OF SYSTEMS:          CONSTITUTIONAL: No weakness, fevers or chills          EYES/ENT: No visual changes;  No vertigo or throat pain           NECK: No pain or stiffness          RESPIRATORY: No cough, wheezing, hemoptysis          CARDIOVASCULAR: no pain, no ARAUJO, no palpitations           GASTROINTESTINAL: No abdominal or epigastric pain. No nausea, vomiting, or hematemesis;           GENITOURINARY: No dysuria, frequency or hematuria          NEUROLOGICAL: No numbness or weakness          SKIN: No itching, rashes    PHYSICAL EXAM:           CONSTITUTIONAL: Well-developed; well-nourished; in no acute distress  	SKIN: warm, dry  	HEAD: Normocephalic; atraumatic  	EYES: PERRL.  	ENT: No nasal discharge, airway clear, mucous membranes moist  	NECK: Supple; non tender.  	CARD: +S1, +S2, no murmurs, gallops, or rubs. (Regular) rate and rhythm    	RESP: No wheezes, rales or rhonchi. CTA B/L  	ABD: soft ntnd, + BS x 4 quadrants  	EXT: moves all extremities,  no clubbing, cyanosis or edema  	NEURO: Alert and oriented x3, no focal deficits          PSYCH: Cooperative, appropriate          VASCULAR:  + Rad / + PTs / + DPs          EXTREMITY:  	   Right Radial: + TR band in place, access site soft, + pulses, no hematoma, no pain, no numbness, no signs and symptoms of infection             ECG/CBC P @ 1415                                                                                                                 LABS:                        14.3   10.56 )-----------( 260      ( 11 Jul 2022 08:43 )             41.3     07-11    145  |  108  |  11  ----------------------------<  102<H>  5.2<H>   |  26  |  0.7    Ca    9.7      11 Jul 2022 08:43      A/P:  I discussed the case with Cardiologist Dr. Mcguire and recommend the following:    S/P PCI:   Intervention:    - Successful balloon angioplasty and INDIO x1 distal LAD      Implants:    - 2.5 X 15 Xience Skypoint drug-eluting stent     FINDINGS:     Coronary Dominance: Left-dominant      LM: Normal    LAD: Mild disease in prox and mid LAD, 80% stenosis of distal LAD s/p PCI    CX: Large dominant vessel, mild disease in prox LCX with 30% stenosis  OM1: Mild disease  Left AV groove artery: patent stent    RCA: small non-dominant vessel with severe diffuse atherosclerosis         LVEDP:12 mmHg     EF: 60 % on TTE       ESTIMATED BLOOD LOSS: < 10 mL        CONDITION:     [x] Good     [] Fair     [] Critical        SPECIMEN REMOVED: N/A       POST-OP DIAGNOSIS:      [] Normal Coronary Angiogram     [] Mild Coronary Artery Disease (< 50% stenosis)     [x] 3 Vessel Coronary Artery Disease (severe disease in non-dominant RCA, L AV groove A, and distal LAD)           PLAN OF CARE:     [x] D/C Home Today     [] Return to In-patient bed     [] Admit for observation     [] Return for Staged Procedure     [] CT Surgery Consult     [x] Medications: ASA, Plavix, Metoprolol, Valsartan, Amlodipine, atorvastatin    [x] IV Fluids: 100cc/hr x 4 hours       PLAN OF CARE:     [] D/C Home Today     [] Return to In-patient bed     [] Admit for observation     [] Return for Staged Procedure     [] CT Surgery Consult     [] Medications:     [] IV Fluids:      Electronic Signatures:  Gertrude Marvin)  (Signed 11-Jul-2022 10:51)  	Authored: Note Type, Note  Oliverio Paul)  (Signature Pending)  	Co-Signer: Note Type, Note      Last Updated: 11-Jul-2022 10:51 by Gertrude Marvin)  	                            Continue DAPT (vazalore 81 mg po daily, plavix 75mg po daily), CCB, Statin Therapy, ARB, PPI                   No BB d/t bradycardia and borderline BP, reevaluate as OP                   GI prophylaxis                   Patient given 30 day supply of (EC  Aspirin 81 mg daily and Plavix 75 mg daily ) to take at home                   CBC/EKG @ 1415                   NS  100 cc/hr x 6 hrs                    OOB-CH, ambulate with assistance                    monitor access site/pulses                   Patient agreeing to take DAPT for at least one year or as directed by cardiologist                    Pt given instructions on importance of taking antiplatelet medication or risk acute stent thrombosis/death                   Post cath instructions, access site care and activity restrictions reviewed with patient                      Benefits of Cardiac Rehab discussed with patient and all documents sent to Cardiac Rehab Center                   Patient instructed to call Cardiac Rehab and make first appointment after first f/u visit with Cardiologist                    Discussed with patient to return to hospital if experience chest pain, shortness breath, dizziness and site bleeding                   Aggressive risk factor modification, diet counseling, smoking cessation discussed with patient                       Can discharge patient from cardiac standpoint @ 1415 if labs/ekg/site WNL and ambulating without symptoms                    Follow up with Cardiology Dr. Mcguire in 1-2 weeks.  Patient instructed to call and make an appointment                      Discharge instructions as follows:                   - Continue medical regimen as prescribed to prevent chest pain                   - Continue dual anti-platelet therapy, ARB, statin, PPI, CCB                   - If you are diabetic and taking medication containing Metformin, do not take them for 48 hours after the procedure                   - Instructed to call 911 if chest pain, shortness of breath or bleeding from access site                   - No heavy lifting >10lbs x 1 week                   - No driving x 24 hours                   - No baths, swimming pools x 1 week, may shower                   - Low sodium low fat low cholesterol diet                   - Follow-up with Cardiologist in 1-2 weeks after discharge

## 2022-07-11 NOTE — CHART NOTE - NSCHARTNOTEFT_GEN_A_CORE
PRE-OP DIAGNOSIS:  Staged PCI for distal LAD 80% lesion  AUC 7    PROCEDURE:     [x] Coronary Angiogram     [x] LHC     [] LVG     [] RHC     [] Intervention (see below)         PHYSICIAN:  Dr. Paul    ASSISTANT:  Yon Joseph    PROCEDURE DESCRIPTION:     Consent:      [x] Patient     [] Family Member     []  Used        Anesthesia:     [] General     [x] Sedation     [x] Local        Access & Closure:     [x] 6-Fr Radial Artery  --> TR bad    [] Fr Femoral Artery     [] Fr Femoral Vein     [] Fr Brachial Vein       IV Contrast: 80 mL        Intervention:    - Successful balloon angioplasty and INDIO x1 distal LAD      Implants:    - 2.5 X 15 Xience Skypoint drug-eluting stent     FINDINGS:     Coronary Dominance: Left-dominant      LM: Normal    LAD: Mild disease in prox and mid LAD, 80% stenosis of distal LAD s/p PCI    CX: Large dominant vessel, mild disease in prox LCX with 30% stenosis  OM1: Mild disease  Left AV groove artery: patent stent    RCA: small non-dominant vessel with severe diffuse atherosclerosis         LVEDP:12 mmHg     EF: 60 % on TTE       ESTIMATED BLOOD LOSS: < 10 mL        CONDITION:     [x] Good     [] Fair     [] Critical        SPECIMEN REMOVED: N/A       POST-OP DIAGNOSIS:      [] Normal Coronary Angiogram     [] Mild Coronary Artery Disease (< 50% stenosis)     [x] 3 Vessel Coronary Artery Disease (severe disease in non-dominant RCA, L AV groove A, and distal LAD)           PLAN OF CARE:     [x] D/C Home Today     [] Return to In-patient bed     [] Admit for observation     [] Return for Staged Procedure     [] CT Surgery Consult     [x] Medications: ASA, Plavix, Metoprolol, Valsartan, Amlodipine, atorvastatin    [x] IV Fluids: 100cc/hr x 4 hours       PLAN OF CARE:     [] D/C Home Today     [] Return to In-patient bed     [] Admit for observation     [] Return for Staged Procedure     [] CT Surgery Consult     [] Medications:     [] IV Fluids: PRE-OP DIAGNOSIS:  Staged PCI for distal LAD 80% lesion  AUC 7    PROCEDURE:     [x] Coronary Angiogram     [x] LHC     [] LVG     [] RHC     [] Intervention (see below)         PHYSICIAN:  Dr. Paul    ASSISTANT:  Yon Joseph    PROCEDURE DESCRIPTION:     Consent:      [x] Patient     [] Family Member     []  Used        Anesthesia:     [] General     [x] Sedation     [x] Local        Access & Closure:     [x] 6-Fr Radial Artery  --> TR bad    [] Fr Femoral Artery     [] Fr Femoral Vein     [] Fr Brachial Vein       IV Contrast: 80 mL        Intervention:    - Successful balloon angioplasty and INDIO x1 distal LAD      Implants:    - 2.5 X 15 Xience Skypoint drug-eluting stent     FINDINGS:     Coronary Dominance: Left-dominant      LM: Normal    LAD: Mild disease in prox and mid LAD, 80% stenosis of distal LAD s/p PCI    CX: Large dominant vessel, mild disease in prox LCX with 30% stenosis  OM1: Mild disease  Left AV groove artery: patent stent    RCA: small non-dominant vessel with severe diffuse atherosclerosis         LVEDP:12 mmHg     EF: 60 % on TTE       ESTIMATED BLOOD LOSS: < 10 mL        CONDITION:     [x] Good     [] Fair     [] Critical        SPECIMEN REMOVED: N/A       POST-OP DIAGNOSIS:      [] Normal Coronary Angiogram     [] Mild Coronary Artery Disease (< 50% stenosis)     [x] 3 Vessel Coronary Artery Disease (severe disease in non-dominant RCA, L AV groove A, and distal LAD)           PLAN OF CARE:     [x] D/C Home Today     [] Return to In-patient bed     [] Admit for observation     [] Return for Staged Procedure     [] CT Surgery Consult     [x] Medications: ASA, Plavix, Valsartan, Amlodipine, atorvastatin    [x] IV Fluids: 100cc/hr x 4 hours

## 2022-07-11 NOTE — ASU PATIENT PROFILE, ADULT - FALL HARM RISK - UNIVERSAL INTERVENTIONS
Bed in lowest position, wheels locked, appropriate side rails in place/Call bell, personal items and telephone in reach/Instruct patient to call for assistance before getting out of bed or chair/Non-slip footwear when patient is out of bed/[place] to call system/Physically safe environment - no spills, clutter or unnecessary equipment/Purposeful Proactive Rounding/Room/bathroom lighting operational, light cord in reach

## 2022-07-14 DIAGNOSIS — I25.10 ATHEROSCLEROTIC HEART DISEASE OF NATIVE CORONARY ARTERY WITHOUT ANGINA PECTORIS: ICD-10-CM

## 2022-07-14 DIAGNOSIS — Z79.02 LONG TERM (CURRENT) USE OF ANTITHROMBOTICS/ANTIPLATELETS: ICD-10-CM

## 2022-07-14 DIAGNOSIS — I10 ESSENTIAL (PRIMARY) HYPERTENSION: ICD-10-CM

## 2022-07-14 DIAGNOSIS — K21.9 GASTRO-ESOPHAGEAL REFLUX DISEASE WITHOUT ESOPHAGITIS: ICD-10-CM

## 2022-07-14 DIAGNOSIS — Z95.5 PRESENCE OF CORONARY ANGIOPLASTY IMPLANT AND GRAFT: ICD-10-CM

## 2022-07-14 DIAGNOSIS — F17.210 NICOTINE DEPENDENCE, CIGARETTES, UNCOMPLICATED: ICD-10-CM

## 2022-07-14 DIAGNOSIS — Z79.82 LONG TERM (CURRENT) USE OF ASPIRIN: ICD-10-CM

## 2022-07-14 DIAGNOSIS — E78.5 HYPERLIPIDEMIA, UNSPECIFIED: ICD-10-CM

## 2022-07-14 DIAGNOSIS — Z82.49 FAMILY HISTORY OF ISCHEMIC HEART DISEASE AND OTHER DISEASES OF THE CIRCULATORY SYSTEM: ICD-10-CM

## 2022-09-12 PROBLEM — E78.5 HYPERLIPIDEMIA, UNSPECIFIED: Chronic | Status: ACTIVE | Noted: 2022-07-11

## 2022-09-12 PROBLEM — I25.10 ATHEROSCLEROTIC HEART DISEASE OF NATIVE CORONARY ARTERY WITHOUT ANGINA PECTORIS: Chronic | Status: ACTIVE | Noted: 2022-04-11

## 2022-09-15 PROBLEM — Z00.00 ENCOUNTER FOR PREVENTIVE HEALTH EXAMINATION: Status: ACTIVE | Noted: 2022-09-15

## 2022-09-16 ENCOUNTER — APPOINTMENT (OUTPATIENT)
Dept: CARDIOTHORACIC SURGERY | Facility: CLINIC | Age: 57
End: 2022-09-16

## 2022-09-16 DIAGNOSIS — F17.210 NICOTINE DEPENDENCE, CIGARETTES, UNCOMPLICATED: ICD-10-CM

## 2022-09-16 PROCEDURE — G0296 VISIT TO DETERM LDCT ELIG: CPT

## 2022-09-16 NOTE — REASON FOR VISIT
[Home] : at home, [unfilled] , at the time of the visit. [Medical Office: ([facility])___] : at the medical office located in  [Verbal consent obtained from patient] : the patient, [unfilled] [Initial Evaluation] : an initial evaluation visit [Review of Eligibility] : review of eligibility [Low-Dose CT Screening Discussion] : low-dose CT lung cancer screening discussion [Virtual Visit] : virtual visit

## 2022-09-16 NOTE — PLAN
[Smoking Cessation Guidance Provided] : Smoking cessation guidance was provided to patient [Smoking Cessation] : smoking cessation [Lifestlye changes] : lifestyle changes [Regular follow-up with healthcare provider] : regular follow-up with healthcare provider [Medication prescribed/changed as per orders] : medication prescribed/changed as per orders [FreeTextEntry1] : Plan:\par -Low Dose CT chest for lung cancer screening\par -Follow up with patient and his referring provider after his LDCT results have been reviewed by the multi-disciplinary clinical team\par -Encouraged smoking cessation\par -[facility] Sm[facility] Quit[facility] literature shared with patient and Staying Smoke Free brochure reviewed\par -Patient declined [facility] Smokers Quitline literature\par -Smoking Cessation was offered, -yes \par -Referred to CTC yes \par Should I Screen? tool utilized. 6 year risk of lung cancer is 3 %. Patient wishes to proceed with screening.\par Engaged in shared decision making with Mr. ION PENA . Answered all questions. He verbalized understanding and agreement. He knows to call back with any questions or concerns\par \par

## 2022-09-16 NOTE — HISTORY OF PRESENT ILLNESS
[Current] : current smoker [>= 30 pack years] : >= 30 pack years [TextBox_13] : Mr. ION PENA is a 57 year old man with a history of CAD s/p stents \par He was seen in the office by Dr. Ramírez for review of eligibility for, as well as, discussion of Low-Dose CT lung cancer screening program. Over the telephone today we reviewed and confirmed that the patient meets screening eligibility criteria:\par -Age: 57 year \par Smoking status:\par -Current smoker\par -Number of pack(s) per day: 1.5\par -Number of years smoked: 32\par -Number of pack years smokin\par \par Mr. PENA denies any signs or symptoms of lung cancer including new cough, change in cough, hemoptysis and unintentional weight loss. \par Mr. PENA denies any personal history of lung cancer. Brother with  lung cancer. Denies any history of lung disease. Denies any history of occupational exposures\par \par  [TextBox_6] : 1.5 [TextBox_8] : 32

## 2022-09-28 ENCOUNTER — OUTPATIENT (OUTPATIENT)
Dept: OUTPATIENT SERVICES | Facility: HOSPITAL | Age: 57
LOS: 1 days | Discharge: HOME | End: 2022-09-28

## 2022-09-28 DIAGNOSIS — Z98.890 OTHER SPECIFIED POSTPROCEDURAL STATES: Chronic | ICD-10-CM

## 2022-09-28 DIAGNOSIS — R06.02 SHORTNESS OF BREATH: ICD-10-CM

## 2022-09-28 PROCEDURE — 71271 CT THORAX LUNG CANCER SCR C-: CPT | Mod: 26
